# Patient Record
Sex: MALE | Race: WHITE | URBAN - METROPOLITAN AREA
[De-identification: names, ages, dates, MRNs, and addresses within clinical notes are randomized per-mention and may not be internally consistent; named-entity substitution may affect disease eponyms.]

---

## 2020-02-15 ENCOUNTER — ED HISTORICAL/CONVERTED ENCOUNTER (OUTPATIENT)
Dept: OTHER | Age: 40
End: 2020-02-15

## 2022-12-30 ENCOUNTER — APPOINTMENT (OUTPATIENT)
Dept: CT IMAGING | Age: 42
End: 2022-12-30
Attending: PHYSICIAN ASSISTANT
Payer: MEDICAID

## 2022-12-30 ENCOUNTER — APPOINTMENT (OUTPATIENT)
Dept: GENERAL RADIOLOGY | Age: 42
End: 2022-12-30
Attending: PHYSICIAN ASSISTANT
Payer: MEDICAID

## 2022-12-30 ENCOUNTER — HOSPITAL ENCOUNTER (OUTPATIENT)
Age: 42
Setting detail: OBSERVATION
Discharge: HOME | End: 2023-01-02
Attending: EMERGENCY MEDICINE | Admitting: INTERNAL MEDICINE
Payer: MEDICAID

## 2022-12-30 DIAGNOSIS — F31.9 BIPOLAR 1 DISORDER (HCC): ICD-10-CM

## 2022-12-30 DIAGNOSIS — R11.2 INTRACTABLE NAUSEA AND VOMITING: ICD-10-CM

## 2022-12-30 DIAGNOSIS — F11.20 METHADONE MAINTENANCE THERAPY PATIENT (HCC): Primary | ICD-10-CM

## 2022-12-30 LAB
ALBUMIN SERPL-MCNC: 3.6 G/DL (ref 3.5–5)
ALBUMIN/GLOB SERPL: 0.8 {RATIO} (ref 1.1–2.2)
ALP SERPL-CCNC: 135 U/L (ref 45–117)
ALT SERPL-CCNC: 56 U/L (ref 12–78)
AMPHET UR QL SCN: NEGATIVE
ANION GAP SERPL CALC-SCNC: 1 MMOL/L (ref 5–15)
APPEARANCE UR: CLEAR
AST SERPL-CCNC: 28 U/L (ref 15–37)
ATRIAL RATE: 70 BPM
BACTERIA URNS QL MICRO: NEGATIVE /HPF
BARBITURATES UR QL SCN: NEGATIVE
BASOPHILS # BLD: 0.1 K/UL (ref 0–0.1)
BASOPHILS NFR BLD: 1 % (ref 0–1)
BENZODIAZ UR QL: NEGATIVE
BILIRUB SERPL-MCNC: 0.4 MG/DL (ref 0.2–1)
BILIRUB UR QL: NEGATIVE
BUN SERPL-MCNC: 13 MG/DL (ref 6–20)
BUN/CREAT SERPL: 17 (ref 12–20)
CALCIUM SERPL-MCNC: 9.6 MG/DL (ref 8.5–10.1)
CALCULATED P AXIS, ECG09: 57 DEGREES
CALCULATED R AXIS, ECG10: 18 DEGREES
CALCULATED T AXIS, ECG11: 49 DEGREES
CANNABINOIDS UR QL SCN: POSITIVE
CHLORIDE SERPL-SCNC: 105 MMOL/L (ref 97–108)
CK SERPL-CCNC: 83 U/L (ref 39–308)
CO2 SERPL-SCNC: 28 MMOL/L (ref 21–32)
COCAINE UR QL SCN: POSITIVE
COLOR UR: ABNORMAL
COMMENT, HOLDF: NORMAL
COVID-19 RAPID TEST, COVR: NOT DETECTED
CREAT SERPL-MCNC: 0.78 MG/DL (ref 0.7–1.3)
DIAGNOSIS, 93000: NORMAL
DIFFERENTIAL METHOD BLD: ABNORMAL
DRUG SCRN COMMENT,DRGCM: ABNORMAL
EOSINOPHIL # BLD: 0.1 K/UL (ref 0–0.4)
EOSINOPHIL NFR BLD: 1 % (ref 0–7)
EPITH CASTS URNS QL MICRO: ABNORMAL /LPF
ERYTHROCYTE [DISTWIDTH] IN BLOOD BY AUTOMATED COUNT: 13.4 % (ref 11.5–14.5)
FLUAV AG NPH QL IA: NEGATIVE
FLUBV AG NOSE QL IA: NEGATIVE
GLOBULIN SER CALC-MCNC: 4.4 G/DL (ref 2–4)
GLUCOSE BLD STRIP.AUTO-MCNC: 114 MG/DL (ref 65–117)
GLUCOSE BLD STRIP.AUTO-MCNC: 134 MG/DL (ref 65–117)
GLUCOSE BLD STRIP.AUTO-MCNC: 137 MG/DL (ref 65–117)
GLUCOSE SERPL-MCNC: 126 MG/DL (ref 65–100)
GLUCOSE UR STRIP.AUTO-MCNC: NEGATIVE MG/DL
HCT VFR BLD AUTO: 47.6 % (ref 36.6–50.3)
HGB BLD-MCNC: 16 G/DL (ref 12.1–17)
HGB UR QL STRIP: NEGATIVE
HYALINE CASTS URNS QL MICRO: ABNORMAL /LPF (ref 0–2)
IMM GRANULOCYTES # BLD AUTO: 0.1 K/UL (ref 0–0.04)
IMM GRANULOCYTES NFR BLD AUTO: 1 % (ref 0–0.5)
KETONES UR QL STRIP.AUTO: 80 MG/DL
LEUKOCYTE ESTERASE UR QL STRIP.AUTO: ABNORMAL
LYMPHOCYTES # BLD: 1.9 K/UL (ref 0.8–3.5)
LYMPHOCYTES NFR BLD: 13 % (ref 12–49)
MCH RBC QN AUTO: 28 PG (ref 26–34)
MCHC RBC AUTO-ENTMCNC: 33.6 G/DL (ref 30–36.5)
MCV RBC AUTO: 83.2 FL (ref 80–99)
METHADONE UR QL: POSITIVE
MONOCYTES # BLD: 0.4 K/UL (ref 0–1)
MONOCYTES NFR BLD: 3 % (ref 5–13)
NEUTS SEG # BLD: 11.8 K/UL (ref 1.8–8)
NEUTS SEG NFR BLD: 81 % (ref 32–75)
NITRITE UR QL STRIP.AUTO: NEGATIVE
NRBC # BLD: 0 K/UL (ref 0–0.01)
NRBC BLD-RTO: 0 PER 100 WBC
OPIATES UR QL: NEGATIVE
P-R INTERVAL, ECG05: 170 MS
PCP UR QL: NEGATIVE
PH UR STRIP: 8.5 [PH] (ref 5–8)
PLATELET # BLD AUTO: 327 K/UL (ref 150–400)
PMV BLD AUTO: 11 FL (ref 8.9–12.9)
POTASSIUM SERPL-SCNC: 4.1 MMOL/L (ref 3.5–5.1)
PROT SERPL-MCNC: 8 G/DL (ref 6.4–8.2)
PROT UR STRIP-MCNC: 30 MG/DL
Q-T INTERVAL, ECG07: 444 MS
QRS DURATION, ECG06: 80 MS
QTC CALCULATION (BEZET), ECG08: 479 MS
RBC # BLD AUTO: 5.72 M/UL (ref 4.1–5.7)
RBC #/AREA URNS HPF: ABNORMAL /HPF (ref 0–5)
SAMPLES BEING HELD,HOLD: NORMAL
SERVICE CMNT-IMP: ABNORMAL
SERVICE CMNT-IMP: ABNORMAL
SERVICE CMNT-IMP: NORMAL
SODIUM SERPL-SCNC: 134 MMOL/L (ref 136–145)
SOURCE, COVRS: NORMAL
SP GR UR REFRACTOMETRY: 1.03
TROPONIN-HIGH SENSITIVITY: 4 NG/L (ref 0–76)
UA: UC IF INDICATED,UAUC: ABNORMAL
UROBILINOGEN UR QL STRIP.AUTO: 1 EU/DL (ref 0.2–1)
VENTRICULAR RATE, ECG03: 70 BPM
WBC # BLD AUTO: 14.3 K/UL (ref 4.1–11.1)
WBC URNS QL MICRO: ABNORMAL /HPF (ref 0–4)

## 2022-12-30 PROCEDURE — 96374 THER/PROPH/DIAG INJ IV PUSH: CPT

## 2022-12-30 PROCEDURE — 74011000250 HC RX REV CODE- 250: Performed by: INTERNAL MEDICINE

## 2022-12-30 PROCEDURE — 74177 CT ABD & PELVIS W/CONTRAST: CPT

## 2022-12-30 PROCEDURE — 74011000258 HC RX REV CODE- 258: Performed by: PHYSICIAN ASSISTANT

## 2022-12-30 PROCEDURE — 96375 TX/PRO/DX INJ NEW DRUG ADDON: CPT

## 2022-12-30 PROCEDURE — 36415 COLL VENOUS BLD VENIPUNCTURE: CPT

## 2022-12-30 PROCEDURE — 93005 ELECTROCARDIOGRAM TRACING: CPT

## 2022-12-30 PROCEDURE — 71046 X-RAY EXAM CHEST 2 VIEWS: CPT

## 2022-12-30 PROCEDURE — 80053 COMPREHEN METABOLIC PANEL: CPT

## 2022-12-30 PROCEDURE — 85025 COMPLETE CBC W/AUTO DIFF WBC: CPT

## 2022-12-30 PROCEDURE — 74011000636 HC RX REV CODE- 636: Performed by: EMERGENCY MEDICINE

## 2022-12-30 PROCEDURE — 81001 URINALYSIS AUTO W/SCOPE: CPT

## 2022-12-30 PROCEDURE — 80307 DRUG TEST PRSMV CHEM ANLYZR: CPT

## 2022-12-30 PROCEDURE — 87804 INFLUENZA ASSAY W/OPTIC: CPT

## 2022-12-30 PROCEDURE — 74011250636 HC RX REV CODE- 250/636: Performed by: INTERNAL MEDICINE

## 2022-12-30 PROCEDURE — 82550 ASSAY OF CK (CPK): CPT

## 2022-12-30 PROCEDURE — 74011250637 HC RX REV CODE- 250/637: Performed by: INTERNAL MEDICINE

## 2022-12-30 PROCEDURE — 87635 SARS-COV-2 COVID-19 AMP PRB: CPT

## 2022-12-30 PROCEDURE — 82962 GLUCOSE BLOOD TEST: CPT

## 2022-12-30 PROCEDURE — 84484 ASSAY OF TROPONIN QUANT: CPT

## 2022-12-30 PROCEDURE — G0378 HOSPITAL OBSERVATION PER HR: HCPCS

## 2022-12-30 PROCEDURE — 74011250636 HC RX REV CODE- 250/636: Performed by: PHYSICIAN ASSISTANT

## 2022-12-30 PROCEDURE — 74011250636 HC RX REV CODE- 250/636

## 2022-12-30 PROCEDURE — 99285 EMERGENCY DEPT VISIT HI MDM: CPT

## 2022-12-30 RX ORDER — ONDANSETRON 2 MG/ML
4 INJECTION INTRAMUSCULAR; INTRAVENOUS ONCE
Status: COMPLETED | OUTPATIENT
Start: 2022-12-30 | End: 2022-12-30

## 2022-12-30 RX ORDER — SODIUM CHLORIDE 0.9 % (FLUSH) 0.9 %
5-40 SYRINGE (ML) INJECTION EVERY 8 HOURS
Status: DISCONTINUED | OUTPATIENT
Start: 2022-12-30 | End: 2023-01-02 | Stop reason: HOSPADM

## 2022-12-30 RX ORDER — POLYETHYLENE GLYCOL 3350 17 G/17G
17 POWDER, FOR SOLUTION ORAL DAILY PRN
Status: DISCONTINUED | OUTPATIENT
Start: 2022-12-30 | End: 2023-01-02 | Stop reason: HOSPADM

## 2022-12-30 RX ORDER — MIRTAZAPINE 15 MG/1
30 TABLET, FILM COATED ORAL DAILY
Status: DISCONTINUED | OUTPATIENT
Start: 2022-12-30 | End: 2023-01-02 | Stop reason: HOSPADM

## 2022-12-30 RX ORDER — INSULIN GLARGINE 100 [IU]/ML
55 INJECTION, SOLUTION SUBCUTANEOUS DAILY
Status: ON HOLD | COMMUNITY
End: 2023-01-02 | Stop reason: SDUPTHER

## 2022-12-30 RX ORDER — METHADONE HYDROCHLORIDE 10 MG/ML
2.5 INJECTION, SOLUTION INTRAMUSCULAR; INTRAVENOUS; SUBCUTANEOUS
Status: CANCELLED | OUTPATIENT
Start: 2022-12-30 | End: 2023-01-06

## 2022-12-30 RX ORDER — ONDANSETRON 4 MG/1
4 TABLET, ORALLY DISINTEGRATING ORAL
Status: DISCONTINUED | OUTPATIENT
Start: 2022-12-30 | End: 2023-01-02 | Stop reason: HOSPADM

## 2022-12-30 RX ORDER — IBUPROFEN 200 MG
1 TABLET ORAL DAILY
Status: DISCONTINUED | OUTPATIENT
Start: 2022-12-31 | End: 2022-12-30

## 2022-12-30 RX ORDER — ACETAMINOPHEN 325 MG/1
650 TABLET ORAL
Status: DISCONTINUED | OUTPATIENT
Start: 2022-12-30 | End: 2023-01-02 | Stop reason: HOSPADM

## 2022-12-30 RX ORDER — CLONAZEPAM 1 MG/1
1 TABLET ORAL 3 TIMES DAILY
Status: ON HOLD | COMMUNITY
End: 2023-01-02 | Stop reason: SDUPTHER

## 2022-12-30 RX ORDER — ZIPRASIDONE HYDROCHLORIDE 60 MG/1
60 CAPSULE ORAL 2 TIMES DAILY WITH MEALS
Status: ON HOLD | COMMUNITY
End: 2023-01-02 | Stop reason: SDUPTHER

## 2022-12-30 RX ORDER — LORAZEPAM 2 MG/ML
1 INJECTION INTRAMUSCULAR ONCE
Status: DISCONTINUED | OUTPATIENT
Start: 2022-12-30 | End: 2022-12-30

## 2022-12-30 RX ORDER — ONDANSETRON 2 MG/ML
4 INJECTION INTRAMUSCULAR; INTRAVENOUS
Status: DISCONTINUED | OUTPATIENT
Start: 2022-12-30 | End: 2023-01-02 | Stop reason: HOSPADM

## 2022-12-30 RX ORDER — PANTOPRAZOLE SODIUM 40 MG/1
40 TABLET, DELAYED RELEASE ORAL
Status: DISCONTINUED | OUTPATIENT
Start: 2022-12-30 | End: 2023-01-02 | Stop reason: HOSPADM

## 2022-12-30 RX ORDER — INSULIN LISPRO 100 [IU]/ML
INJECTION, SOLUTION INTRAVENOUS; SUBCUTANEOUS
Status: DISCONTINUED | OUTPATIENT
Start: 2022-12-30 | End: 2023-01-02 | Stop reason: HOSPADM

## 2022-12-30 RX ORDER — PANTOPRAZOLE SODIUM 40 MG/1
40 TABLET, DELAYED RELEASE ORAL
Status: DISCONTINUED | OUTPATIENT
Start: 2022-12-31 | End: 2022-12-30

## 2022-12-30 RX ORDER — METOCLOPRAMIDE HYDROCHLORIDE 5 MG/ML
10 INJECTION INTRAMUSCULAR; INTRAVENOUS
Status: COMPLETED | OUTPATIENT
Start: 2022-12-30 | End: 2022-12-30

## 2022-12-30 RX ORDER — CLONIDINE HYDROCHLORIDE 0.2 MG/1
0.2 TABLET ORAL 2 TIMES DAILY
COMMUNITY

## 2022-12-30 RX ORDER — METOCLOPRAMIDE HYDROCHLORIDE 5 MG/ML
10 INJECTION INTRAMUSCULAR; INTRAVENOUS
Status: DISCONTINUED | OUTPATIENT
Start: 2022-12-30 | End: 2022-12-31

## 2022-12-30 RX ORDER — SODIUM CHLORIDE 0.9 % (FLUSH) 0.9 %
5-40 SYRINGE (ML) INJECTION AS NEEDED
Status: DISCONTINUED | OUTPATIENT
Start: 2022-12-30 | End: 2023-01-02 | Stop reason: HOSPADM

## 2022-12-30 RX ORDER — ZIPRASIDONE HYDROCHLORIDE 20 MG/1
60 CAPSULE ORAL 2 TIMES DAILY WITH MEALS
Status: DISCONTINUED | OUTPATIENT
Start: 2022-12-30 | End: 2023-01-02 | Stop reason: HOSPADM

## 2022-12-30 RX ORDER — IBUPROFEN 200 MG
1 TABLET ORAL DAILY
Status: DISCONTINUED | OUTPATIENT
Start: 2022-12-30 | End: 2023-01-02 | Stop reason: HOSPADM

## 2022-12-30 RX ORDER — MIRTAZAPINE 30 MG/1
30 TABLET, FILM COATED ORAL
COMMUNITY

## 2022-12-30 RX ORDER — ACETAMINOPHEN 650 MG/1
650 SUPPOSITORY RECTAL
Status: DISCONTINUED | OUTPATIENT
Start: 2022-12-30 | End: 2023-01-02 | Stop reason: HOSPADM

## 2022-12-30 RX ORDER — ENOXAPARIN SODIUM 100 MG/ML
40 INJECTION SUBCUTANEOUS DAILY
Status: DISCONTINUED | OUTPATIENT
Start: 2022-12-31 | End: 2023-01-02 | Stop reason: HOSPADM

## 2022-12-30 RX ORDER — MAGNESIUM SULFATE 100 %
4 CRYSTALS MISCELLANEOUS AS NEEDED
Status: DISCONTINUED | OUTPATIENT
Start: 2022-12-30 | End: 2023-01-02 | Stop reason: HOSPADM

## 2022-12-30 RX ORDER — LORAZEPAM 2 MG/ML
1 INJECTION INTRAMUSCULAR ONCE
Status: COMPLETED | OUTPATIENT
Start: 2022-12-30 | End: 2022-12-30

## 2022-12-30 RX ORDER — METHADONE HYDROCHLORIDE 10 MG/1
90 TABLET ORAL DAILY
Status: DISCONTINUED | OUTPATIENT
Start: 2022-12-31 | End: 2023-01-02 | Stop reason: HOSPADM

## 2022-12-30 RX ORDER — METHADONE HYDROCHLORIDE 10 MG/ML
95 INJECTION, SOLUTION INTRAMUSCULAR; INTRAVENOUS; SUBCUTANEOUS ONCE
Status: DISCONTINUED | OUTPATIENT
Start: 2022-12-30 | End: 2022-12-30

## 2022-12-30 RX ADMIN — ONDANSETRON 4 MG: 2 INJECTION INTRAMUSCULAR; INTRAVENOUS at 11:46

## 2022-12-30 RX ADMIN — PANTOPRAZOLE SODIUM 40 MG: 40 TABLET, DELAYED RELEASE ORAL at 19:27

## 2022-12-30 RX ADMIN — LORAZEPAM 1 MG: 2 INJECTION INTRAMUSCULAR; INTRAVENOUS at 13:06

## 2022-12-30 RX ADMIN — IOPAMIDOL 100 ML: 755 INJECTION, SOLUTION INTRAVENOUS at 12:53

## 2022-12-30 RX ADMIN — MIRTAZAPINE 30 MG: 15 TABLET, FILM COATED ORAL at 16:14

## 2022-12-30 RX ADMIN — METHADONE HYDROCHLORIDE: 10 INJECTION, SOLUTION INTRAMUSCULAR; INTRAVENOUS; SUBCUTANEOUS at 11:46

## 2022-12-30 RX ADMIN — ZIPRASIDONE HYDROCHLORIDE 60 MG: 40 CAPSULE ORAL at 16:14

## 2022-12-30 RX ADMIN — SODIUM CHLORIDE, PRESERVATIVE FREE 10 ML: 5 INJECTION INTRAVENOUS at 14:34

## 2022-12-30 RX ADMIN — METOCLOPRAMIDE 10 MG: 5 INJECTION, SOLUTION INTRAMUSCULAR; INTRAVENOUS at 14:54

## 2022-12-30 RX ADMIN — SODIUM CHLORIDE, PRESERVATIVE FREE 10 ML: 5 INJECTION INTRAVENOUS at 21:07

## 2022-12-30 RX ADMIN — SODIUM CHLORIDE 1000 ML: 9 INJECTION, SOLUTION INTRAVENOUS at 11:46

## 2022-12-30 NOTE — ED NOTES
Face-To-Face Encounter with an TELLY's Patient:      The patient presents with nausea and vomiting. Missed methadone today. My exam shows 38 YOM, resting in bed, mildly diaphoretic. No abdominal distention. Impression/Plan: 44-year-old male presents emerged department chief plaint of myalgias and nausea with vomiting that began 2 days ago. Does report missing his methadone dose today. History of polysubstance abuse. Patient appears uncomfortable but nontoxic. Basic labs were obtained, these are unremarkable other than mild reactive leukocytosis. CT without acute pathology. Urinalysis not consistent with cystitis noted on CT. Patient's UDS is positive for cocaine, EKG unremarkable, troponin not elevated. Doubt vasospastic ACS. Will medicate for nausea, bolus fluids, confirmed with patient's methadone clinic for his dose that he missed. Ativan as needed for comfort. If ED work-up is negative, anticipate patient be discharged with PCP follow-up and to follow-up with his methadone clinic.    2:43 PM: Patient continues to vomit, Eric Li discussed patient with Dr. Shira Mcnamara for admission. I have personally seen and examined the patient, reviewed the TELLY's note and agree with findings and plan. I have personally performed a substantial portion of the encounter.     Judson Franklin MD

## 2022-12-30 NOTE — H&P
GENERAL GENERIC H&P/CONSULT  Presenting complaint:Nausea/vomiting    Subjective: 82-year-old male with past medical history multiple comorbidities presents to [facility] with complaints of nausea and vomiting. Patient states over the past few days has been having multiple episodes of nausea and vomiting, patient was unable to get his methadone dose, this is similar to methadone withdrawal that has occurred in the past.  Patient denies any fevers chills lightheadedness dizziness dyspnea orthopnea paroxysmal nocturnal dyspnea chest pain palpitations headache focal weakness loss of sensation auditory or visual symptoms abdominal stool or urinary complaints or any other associated symptoms. Patient endorses no recent sick contacts or travel activity    Past Medical History:   Diagnosis Date    Anxiety     Bipolar affect, depressed ([facility]     Diabetes ([facility]     Hypercholesteremia     Hypertension     Paranoia ([facility]     [facility]       History reviewed. No pertinent surgical history. Prior to Admission medications    Medication Sig Start Date End Date Taking? Authorizing Provider   cloNIDine HCL (CATAPRES) 0.2 mg tablet Take 0.2 mg by mouth two (2) times a day. Yes Keila, MD Sofia   ziprasidone hcl (Geodon) 60 mg capsule Take 60 mg by mouth two (2) times daily (with meals). Yes Sofia Pedraza MD   mirtazapine (Remeron) 30 mg tablet Take 30 mg by mouth nightly. Yes Sofia Pedraza MD   clonazePAM (KlonoPIN) 1 mg tablet Take 1 mg by mouth three (3) times daily. Yes Keila, MD Sofia   insulin glargine (Lantus Solostar U-100 Insulin) 100 unit/mL (3 mL) inpn 55 Units by SubCUTAneous route daily. Yes Sofia Pedraza MD   insulin regular (HumuLIN R Regular U-100 Insuln) 100 unit/mL injection 17 Units by SubCUTAneous route daily as needed.    Yes Keila, MD Sofia     No Known Allergies   Social History     Tobacco Use    Smoking status: Every Day     Packs/day: 1.00     Types: Cigarettes    Smokeless tobacco: Not on file   Substance Use Topics    Alcohol use: Not Currently      History reviewed. No pertinent family history. Review of Systems   Constitutional:  Positive for activity change, appetite change and fatigue. Negative for chills, diaphoresis, fever and unexpected weight change. HENT:  Negative for congestion, dental problem, drooling, ear discharge, ear pain, facial swelling, hearing loss, mouth sores, nosebleeds, postnasal drip, rhinorrhea, sinus pressure, sinus pain, sneezing, sore throat, tinnitus, trouble swallowing and voice change. Eyes:  Negative for photophobia, pain, discharge, redness, itching and visual disturbance. Respiratory:  Negative for apnea, cough, choking, chest tightness, shortness of breath, wheezing and stridor. Cardiovascular:  Negative for chest pain, palpitations and leg swelling. Gastrointestinal:  Positive for nausea and vomiting. Negative for abdominal distention, abdominal pain, anal bleeding, blood in stool, constipation, diarrhea and rectal pain. Endocrine: Negative for cold intolerance, heat intolerance, polydipsia, polyphagia and polyuria. Genitourinary:  Negative for decreased urine volume, difficulty urinating, dysuria, enuresis, flank pain, frequency, genital sores, hematuria, penile discharge, penile pain, penile swelling, scrotal swelling, testicular pain and urgency. Musculoskeletal:  Negative for arthralgias, back pain, gait problem, joint swelling, myalgias, neck pain and neck stiffness. Skin:  Negative for color change, pallor, rash and wound. Allergic/Immunologic: Negative for environmental allergies, food allergies and immunocompromised state. Neurological:  Positive for weakness. Negative for dizziness, tremors, seizures, syncope, facial asymmetry, speech difficulty, light-headedness, numbness and headaches. Hematological:  Negative for adenopathy. Does not bruise/bleed easily.    Psychiatric/Behavioral:  Negative for agitation, behavioral problems, confusion, decreased concentration, dysphoric mood, hallucinations, self-injury, sleep disturbance and suicidal ideas. The patient is not nervous/anxious and is not hyperactive. Objective:    No intake/output data recorded. No intake/output data recorded. Patient Vitals for the past 8 hrs:   BP Temp Pulse Resp SpO2 Height Weight   12/30/22 0903 (!) 159/89 97.2 °F (36.2 °C) 70 20 98 % 6' (1.829 m) 89.8 kg (198 lb)     Physical Exam  Vitals reviewed. Constitutional:       General: He is not in acute distress. Appearance: He is normal weight. He is ill-appearing. He is not toxic-appearing or diaphoretic. HENT:      Head: Normocephalic and atraumatic. Nose: Nose normal. No congestion or rhinorrhea. Mouth/Throat:      Mouth: Mucous membranes are moist.      Pharynx: Oropharynx is clear. No oropharyngeal exudate or posterior oropharyngeal erythema. Eyes:      General: No scleral icterus. Right eye: No discharge. Left eye: No discharge. Extraocular Movements: Extraocular movements intact. Conjunctiva/sclera: Conjunctivae normal.      Pupils: Pupils are equal, round, and reactive to light. Neck:      Vascular: No carotid bruit. Cardiovascular:      Rate and Rhythm: Normal rate and regular rhythm. Pulses: Normal pulses. Heart sounds: Normal heart sounds. No murmur heard. No friction rub. No gallop. Pulmonary:      Effort: Pulmonary effort is normal. No respiratory distress. Breath sounds: Normal breath sounds. No stridor. No wheezing, rhonchi or rales. Chest:      Chest wall: No tenderness. Abdominal:      General: Abdomen is flat. Bowel sounds are normal. There is no distension. Palpations: Abdomen is soft. There is no mass. Tenderness: There is no abdominal tenderness. There is no right CVA tenderness, left CVA tenderness, guarding or rebound. Hernia: No hernia is present.    Musculoskeletal:         General: No swelling, tenderness, deformity or signs of injury. Normal range of motion. Cervical back: Normal range of motion and neck supple. No rigidity or tenderness. Right lower leg: No edema. Left lower leg: No edema. Lymphadenopathy:      Cervical: No cervical adenopathy. Skin:     General: Skin is warm. Capillary Refill: Capillary refill takes less than 2 seconds. Coloration: Skin is not jaundiced or pale. Findings: No bruising, erythema, lesion or rash. Neurological:      General: No focal deficit present. Mental Status: He is alert and oriented to person, place, and time. Mental status is at baseline. Cranial Nerves: No cranial nerve deficit. Sensory: No sensory deficit. Motor: No weakness. Coordination: Coordination normal.      Gait: Gait normal.      Deep Tendon Reflexes: Reflexes normal.   Psychiatric:         Mood and Affect: Mood normal.         Behavior: Behavior normal.         Thought Content:  Thought content normal.         Judgment: Judgment normal.        Labs:    Recent Results (from the past 24 hour(s))   GLUCOSE, POC    Collection Time: 12/30/22  9:07 AM   Result Value Ref Range    Glucose (POC) 114 65 - 117 mg/dL    Performed by Kristina Sevilla RN    TROPONIN-HIGH SENSITIVITY    Collection Time: 12/30/22 10:25 AM   Result Value Ref Range    Troponin-High Sensitivity 4 0 - 76 ng/L   CBC WITH AUTOMATED DIFF    Collection Time: 12/30/22 10:26 AM   Result Value Ref Range    WBC 14.3 (H) 4.1 - 11.1 K/uL    RBC 5.72 (H) 4.10 - 5.70 M/uL    HGB 16.0 12.1 - 17.0 g/dL    HCT 47.6 36.6 - 50.3 %    MCV 83.2 80.0 - 99.0 FL    MCH 28.0 26.0 - 34.0 PG    MCHC 33.6 30.0 - 36.5 g/dL    RDW 13.4 11.5 - 14.5 %    PLATELET 023 353 - 284 K/uL    MPV 11.0 8.9 - 12.9 FL    NRBC 0.0 0  WBC    ABSOLUTE NRBC 0.00 0.00 - 0.01 K/uL    NEUTROPHILS 81 (H) 32 - 75 %    LYMPHOCYTES 13 12 - 49 %    MONOCYTES 3 (L) 5 - 13 %    EOSINOPHILS 1 0 - 7 %    BASOPHILS 1 0 - 1 %    IMMATURE GRANULOCYTES 1 (H) 0.0 - 0.5 %    ABS. NEUTROPHILS 11.8 (H) 1.8 - 8.0 K/UL    ABS. LYMPHOCYTES 1.9 0.8 - 3.5 K/UL    ABS. MONOCYTES 0.4 0.0 - 1.0 K/UL    ABS. EOSINOPHILS 0.1 0.0 - 0.4 K/UL    ABS. BASOPHILS 0.1 0.0 - 0.1 K/UL    ABS. IMM. GRANS. 0.1 (H) 0.00 - 0.04 K/UL    DF AUTOMATED     CK    Collection Time: 12/30/22 10:26 AM   Result Value Ref Range    CK 83 39 - 975 U/L   METABOLIC PANEL, COMPREHENSIVE    Collection Time: 12/30/22 10:26 AM   Result Value Ref Range    Sodium 134 (L) 136 - 145 mmol/L    Potassium 4.1 3.5 - 5.1 mmol/L    Chloride 105 97 - 108 mmol/L    CO2 28 21 - 32 mmol/L    Anion gap 1 (L) 5 - 15 mmol/L    Glucose 126 (H) 65 - 100 mg/dL    BUN 13 6 - 20 MG/DL    Creatinine 0.78 0.70 - 1.30 MG/DL    BUN/Creatinine ratio 17 12 - 20      eGFR >60 >60 ml/min/1.73m2    Calcium 9.6 8.5 - 10.1 MG/DL    Bilirubin, total 0.4 0.2 - 1.0 MG/DL    ALT (SGPT) 56 12 - 78 U/L    AST (SGOT) 28 15 - 37 U/L    Alk. phosphatase 135 (H) 45 - 117 U/L    Protein, total 8.0 6.4 - 8.2 g/dL    Albumin 3.6 3.5 - 5.0 g/dL    Globulin 4.4 (H) 2.0 - 4.0 g/dL    A-G Ratio 0.8 (L) 1.1 - 2.2     SAMPLES BEING HELD    Collection Time: 12/30/22 10:26 AM   Result Value Ref Range    SAMPLES BEING HELD PST     COMMENT        Add-on orders for these samples will be processed based on acceptable specimen integrity and analyte stability, which may vary by analyte.    URINALYSIS W/ REFLEX CULTURE    Collection Time: 12/30/22 11:39 AM    Specimen: Urine   Result Value Ref Range    Color YELLOW/STRAW      Appearance CLEAR CLEAR      Specific gravity 1.026      pH (UA) 8.5 (H) 5.0 - 8.0      Protein 30 (A) NEG mg/dL    Glucose Negative NEG mg/dL    Ketone 80 (A) NEG mg/dL    Bilirubin Negative NEG      Blood Negative NEG      Urobilinogen 1.0 0.2 - 1.0 EU/dL    Nitrites Negative NEG      Leukocyte Esterase TRACE (A) NEG      UA:UC IF INDICATED CULTURE NOT INDICATED BY UA RESULT CNI      WBC 0-4 0 - 4 /hpf    RBC 0-5 0 - 5 /hpf    Epithelial cells FEW FEW /lpf    Bacteria Negative NEG /hpf    Hyaline cast 0-2 0 - 2 /lpf   DRUG SCREEN, URINE    Collection Time: 12/30/22 11:39 AM   Result Value Ref Range    AMPHETAMINES Negative NEG      BARBITURATES Negative NEG      BENZODIAZEPINES Negative NEG      COCAINE Positive (A) NEG      METHADONE Positive (A) NEG      OPIATES Negative NEG      PCP(PHENCYCLIDINE) Negative NEG      THC (TH-CANNABINOL) Positive (A) NEG      Drug screen comment (NOTE)    INFLUENZA A+B VIRAL AGS    Collection Time: 12/30/22 11:39 AM   Result Value Ref Range    Influenza A Antigen Negative NEG      Influenza B Antigen Negative NEG     COVID-19 RAPID TEST    Collection Time: 12/30/22 11:39 AM   Result Value Ref Range    Specimen source Nasopharyngeal      COVID-19 rapid test Not detected NOTD     EKG, 12 LEAD, INITIAL    Collection Time: 12/30/22  1:07 PM   Result Value Ref Range    Ventricular Rate 70 BPM    Atrial Rate 70 BPM    P-R Interval 170 ms    QRS Duration 80 ms    Q-T Interval 444 ms    QTC Calculation (Bezet) 479 ms    Calculated P Axis 57 degrees    Calculated R Axis 18 degrees    Calculated T Axis 49 degrees    Diagnosis       Normal sinus rhythm  Normal ECG  No previous ECGs available         ECG: nonspecific ST and T waves changes     CT abdomen/pelvis:IMPRESSION  1. The bladder is mostly decompressed with some concentric mural thickening. Recommend clinical correlation for cystitis. 2. Hepatic steatosis. 3. Incidental findings as above.     Assessment:  Active Problems:    Intractable nausea and vomiting (12/30/2022)        Plan:    Intractable nausea and vomiting-patient presents with vomiting symptomatology found of intractable nausea and vomiting, likely secondary to opiate withdrawal, of note patient missed methadone doses, feels slightly better at this time  Restart patient's home methadone dosage  Zofran as needed for symptomatic relief  Continue to monitor patient's clinical status    Opiate withdrawal-reinitiate patient's methadone according to home dosage, patient is in a methadone program  Obtain psychiatry consult    History of bipolar disorder-continue home medications    Type 2 diabetes-insulin sliding scale at this time    Prophylaxis-Lovenox  FEN-diabetic diet, replete potassium and magnesium  Full code, will clarify about surrogate decision-maker, admitted for observation and further management    Signed:   Antonio Moyer MD 12/30/2022

## 2022-12-30 NOTE — ED NOTES
Patient is being transferred to [address], Room # 2214. Report given to [facility], RN on Olinda Rodriguez for routine progression of care. Report consisted of the following information SBAR, Kardex, ED Summary, MAR and Recent Results. Patient transferred to receiving unit by: transport (RN or tech name). Outstanding consults needed: No     Next labs due: Yes    The following personal items will be sent with the patient during transfer to the floor: All valuables:    Cardiac monitoring ordered: Yes    The following CURRENT information was reported to the receiving RN:    Code status: Full Code at time of transfer    Last set of vital signs:  Vital Signs  Level of Consciousness: Alert (0) (12/30/22 0903)  Temp: 97.2 °F (36.2 °C) (12/30/22 0903)  Temp Source: Oral (12/30/22 0903)  Pulse (Heart Rate): 70 (12/30/22 0903)  Resp Rate: 20 (12/30/22 0903)  BP: (!) 159/89 (12/30/22 0903)  MAP (Calculated): 112 (12/30/22 0903)  MEWS Score: 1 (12/30/22 0903)         Oxygen Therapy  O2 Sat (%): 98 % (12/30/22 0903)  O2 Device: None (Room air) (12/30/22 0903)      Last pain assessment:  Pain 1  Pain Scale 1: Numeric (0 - 10)  Pain Intensity 1: 10      Wounds: No     Urinary catheter: voiding  Is there a [place] order: No     LDAs:       Peripheral IV 12/30/22 Left;Posterior Hand (Active)         Opportunity for questions and clarification was provided.     Pavan Ruelas RN

## 2022-12-30 NOTE — ED PROVIDER NOTES
EMERGENCY DEPARTMENT HISTORY AND PHYSICAL EXAM      Pt Name: Shelby Tan  MRN: [id number]  [place] 1980  Date of evaluation: 12/30/2022  Provider: COBY Tesfaye    PCP: No primary care provider on file. Note Started: 9:51 AM 12/30/22    Shared Not Shared TELLY: I have seen and evaluated the patient. My supervision physician was available for consultation. History of Presenting Illness     Chief Complaint   Patient presents with    Generalized Body Aches     Pt arrives to ED via EMS with a cc of generally not feeling well x 2-3 days; pt states he is out of BP and diabetic medications; EMS states that patient used crack last night; pt coming coming from hotel        History Provided By: Patient    HPI: Shelby Tan, 38 y.o. male with past medical history as documented below, polysubstance abuse on methadone who presents emerged part with multiple complaints. For the past 2 days he has had a constellation of fatigue, vomiting, sweating and generalized feelings of withdrawal.  He states he missed his his methadone yesterday and feels like he is in withdrawal.  He does report using crack cocaine last evening with no resolution in his symptoms. He denies any fevers, headaches, dizziness, chest pain, shortness of breath, abdominal pain, diarrhea, bloody stools, dysuria, hematuria, rash. There are no other complaints, changes, or physical findings at this time. PCP: No primary care provider on file. No current facility-administered medications on file prior to encounter. No current outpatient medications on file prior to encounter. Past History     Past Medical History:  No past medical history on file. Past Surgical History:  No past surgical history on file. Family History:  No family history on file. Social History: Allergies:  No Known Allergies      Review of Systems   Review of Systems   Constitutional:  Positive for diaphoresis and fatigue.  Negative for chills and fever.   HENT:  Negative for congestion and sore throat. Respiratory:  Negative for cough and shortness of breath. Cardiovascular:  Negative for chest pain. Gastrointestinal:  Positive for nausea and vomiting. Negative for abdominal pain and diarrhea. Genitourinary:  Negative for dysuria and hematuria. Musculoskeletal:  Positive for myalgias. Skin:  Negative for rash. Neurological:  Negative for headaches. All other systems reviewed and are negative. Physical Exam   Patient Vitals for the past 4 hrs:   Temp Pulse Resp BP SpO2   12/30/22 0903 97.2 °F (36.2 °C) 70 20 (!) 159/89 98 %        Physical Exam  Vitals and nursing note reviewed. Constitutional:       General: He is not in acute distress. Appearance: He is not toxic-appearing. Comments: Patient uncomfortable,    HENT:      Head: Atraumatic. Right Ear: External ear normal.      Left Ear: External ear normal.      Nose: Nose normal.      Mouth/Throat:      Mouth: Mucous membranes are moist.   Eyes:      Extraocular Movements: Extraocular movements intact. Conjunctiva/sclera: Conjunctivae normal.   Cardiovascular:      Rate and Rhythm: Normal rate and regular rhythm. Pulses: Normal pulses. Heart sounds: Normal heart sounds. No murmur heard. No friction rub. No gallop. Pulmonary:      Effort: Pulmonary effort is normal. No respiratory distress. Breath sounds: Normal breath sounds. No stridor. No wheezing, rhonchi or rales. Abdominal:      General: Abdomen is flat. There is no distension. Palpations: Abdomen is soft. Tenderness: There is no abdominal tenderness. There is no guarding or rebound. Comments: Abdomen soft, nontender with no guarding rigidity or rebound tenderness     Musculoskeletal:         General: No swelling. Cervical back: Neck supple. Skin:     General: Skin is warm. Neurological:      Mental Status: He is alert and oriented to person, place, and time. Psychiatric:         Mood and Affect: Mood normal.         Behavior: Behavior normal.         Thought Content: Thought content normal.         Judgment: Judgment normal.       Diagnostic Study Results     Labs -     Recent Results (from the past 12 hour(s))   GLUCOSE, POC    Collection Time: 12/30/22  9:07 AM   Result Value Ref Range    Glucose (POC) 114 65 - 117 mg/dL    Performed by Christiane Reese RN        EKG: When ordered, EKG's are interpreted by the Emergency Department Physician in the absence of a cardiologist.  Please see their note for interpretation of EKG. Radiologic Studies -   No results found. Medical Decision Making   I am the first provider for this patient. I reviewed the vital signs, available nursing notes, past medical history, past surgical history, family history and social history. Vital Signs-Reviewed the patient's vital signs. Patient Vitals for the past 12 hrs:   Temp Pulse Resp BP SpO2   12/30/22 0903 97.2 °F (36.2 °C) 70 20 (!) 159/89 98 %       Records Reviewed: Nursing Notes and Old Medical Records    Provider Notes (Medical Decision Making):  Patient was evaluated for a 2-day history of intractable vomiting, malaise and withdrawal-like symptoms in the setting of polysubstance abuse and methadone treatment. Patient did report a recent history of crack cocaine use but otherwise no chest pain, abdominal pain or shortness of breath. EKG and troponin showed no signs of ACS. CT abdomen pelvis had no acute findings. No RIVERA or concerning electrolyte disturbance. Despite being given methadone, Zofran and fluids patient's vomiting did not come under control. Patient was subsequently admitted to the hospitalist service for further stabilization and evaluation. ED Course:   Initial assessment performed. The patients presenting problems have been discussed, and they are in agreement with the care plan formulated and outlined with them.   I have encouraged them to ask questions as they arise throughout their visit. ED Course as of 12/30/22 1542   Fri Dec 30, 2022   1002 I spoke with the nurse on-call at [facility] methadone clinic who confirmed patient receives 95 mg daily. His last dose was at 5:51 AM yesterday, which is not consistent with the patient's history stating that his last methadone dose was 2 days ago. He has not had any methadone today. I did confirm with [facility] that it was okay to administer methadone to the patient in the ED [AJ]   1229 Patient reevaluated. He is tolerating p.o. but continues to state that he is \"not feeling well \", but does not localize articulate his symptoms otherwise. He is diaphoretic. He has mild left lower quadrant tenderness but otherwise unremarkable abdomen. Will order CT abdomen pelvis, chest x-ray, troponin and EKG. [AJ]   1313 EKG interpreted by me. Shows NSR with a HR of 70. No ST elevations or depressions concerning for ischemia. Normal intervals. [MB]   4841 Reassessed patient, improving symptoms, resting in bed. [MB]   4448 I discussed the case with hospitalist Dr. Urvashi Barrera who will admit the patient for intractable nausea and vomiting. [AJ]      ED Course User Index  [AJ] COBY Beyer  [MB] Alem Thompson MD       Critical Care Time: None    Disposition:  Admission    PLAN:  1. There are no discharge medications for this patient. 2.   Follow-up Information    None       Return to ED if worse     Diagnosis     Clinical Impression: No diagnosis found. Withdrawal symptoms, intractable nausea and vomiting, dehydration, polysubstance abuse  COBY Lewis (Electronic Signature)          Please note that this dictation was completed with [employer], the [employer] voice recognition software. Quite often unanticipated grammatical, syntax, homophones, and other interpretive errors are inadvertently transcribed by the computer software. Please disregards these errors.  Please excuse any errors that have escaped final proofreading.

## 2022-12-31 LAB
ANION GAP SERPL CALC-SCNC: 2 MMOL/L (ref 5–15)
BASOPHILS # BLD: 0.1 K/UL (ref 0–0.1)
BASOPHILS NFR BLD: 1 % (ref 0–1)
BUN SERPL-MCNC: 12 MG/DL (ref 6–20)
BUN/CREAT SERPL: 15 (ref 12–20)
CALCIUM SERPL-MCNC: 9 MG/DL (ref 8.5–10.1)
CHLORIDE SERPL-SCNC: 109 MMOL/L (ref 97–108)
CO2 SERPL-SCNC: 28 MMOL/L (ref 21–32)
CREAT SERPL-MCNC: 0.81 MG/DL (ref 0.7–1.3)
DIFFERENTIAL METHOD BLD: ABNORMAL
EOSINOPHIL # BLD: 0.5 K/UL (ref 0–0.4)
EOSINOPHIL NFR BLD: 4 % (ref 0–7)
ERYTHROCYTE [DISTWIDTH] IN BLOOD BY AUTOMATED COUNT: 13.6 % (ref 11.5–14.5)
EST. AVERAGE GLUCOSE BLD GHB EST-MCNC: 148 MG/DL
GLUCOSE BLD STRIP.AUTO-MCNC: 130 MG/DL (ref 65–117)
GLUCOSE BLD STRIP.AUTO-MCNC: 142 MG/DL (ref 65–117)
GLUCOSE BLD STRIP.AUTO-MCNC: 242 MG/DL (ref 65–117)
GLUCOSE BLD STRIP.AUTO-MCNC: 309 MG/DL (ref 65–117)
GLUCOSE SERPL-MCNC: 103 MG/DL (ref 65–100)
HBA1C MFR BLD: 6.8 % (ref 4–5.6)
HCT VFR BLD AUTO: 46.3 % (ref 36.6–50.3)
HGB BLD-MCNC: 15.5 G/DL (ref 12.1–17)
IMM GRANULOCYTES # BLD AUTO: 0.1 K/UL (ref 0–0.04)
IMM GRANULOCYTES NFR BLD AUTO: 1 % (ref 0–0.5)
LYMPHOCYTES # BLD: 3.8 K/UL (ref 0.8–3.5)
LYMPHOCYTES NFR BLD: 32 % (ref 12–49)
MAGNESIUM SERPL-MCNC: 2.3 MG/DL (ref 1.6–2.4)
MCH RBC QN AUTO: 28.7 PG (ref 26–34)
MCHC RBC AUTO-ENTMCNC: 33.5 G/DL (ref 30–36.5)
MCV RBC AUTO: 85.7 FL (ref 80–99)
MONOCYTES # BLD: 0.7 K/UL (ref 0–1)
MONOCYTES NFR BLD: 5 % (ref 5–13)
NEUTS SEG # BLD: 6.9 K/UL (ref 1.8–8)
NEUTS SEG NFR BLD: 57 % (ref 32–75)
NRBC # BLD: 0 K/UL (ref 0–0.01)
NRBC BLD-RTO: 0 PER 100 WBC
PLATELET # BLD AUTO: 277 K/UL (ref 150–400)
PMV BLD AUTO: 11.1 FL (ref 8.9–12.9)
POTASSIUM SERPL-SCNC: 4.1 MMOL/L (ref 3.5–5.1)
RBC # BLD AUTO: 5.4 M/UL (ref 4.1–5.7)
SERVICE CMNT-IMP: ABNORMAL
SODIUM SERPL-SCNC: 139 MMOL/L (ref 136–145)
WBC # BLD AUTO: 12 K/UL (ref 4.1–11.1)

## 2022-12-31 PROCEDURE — 85025 COMPLETE CBC W/AUTO DIFF WBC: CPT

## 2022-12-31 PROCEDURE — 74011636637 HC RX REV CODE- 636/637: Performed by: INTERNAL MEDICINE

## 2022-12-31 PROCEDURE — 83036 HEMOGLOBIN GLYCOSYLATED A1C: CPT

## 2022-12-31 PROCEDURE — 96372 THER/PROPH/DIAG INJ SC/IM: CPT

## 2022-12-31 PROCEDURE — 82962 GLUCOSE BLOOD TEST: CPT

## 2022-12-31 PROCEDURE — 74011250637 HC RX REV CODE- 250/637: Performed by: INTERNAL MEDICINE

## 2022-12-31 PROCEDURE — 74011250636 HC RX REV CODE- 250/636: Performed by: INTERNAL MEDICINE

## 2022-12-31 PROCEDURE — 80048 BASIC METABOLIC PNL TOTAL CA: CPT

## 2022-12-31 PROCEDURE — 83735 ASSAY OF MAGNESIUM: CPT

## 2022-12-31 PROCEDURE — 74011000250 HC RX REV CODE- 250: Performed by: INTERNAL MEDICINE

## 2022-12-31 PROCEDURE — 36415 COLL VENOUS BLD VENIPUNCTURE: CPT

## 2022-12-31 PROCEDURE — G0378 HOSPITAL OBSERVATION PER HR: HCPCS

## 2022-12-31 PROCEDURE — 96376 TX/PRO/DX INJ SAME DRUG ADON: CPT

## 2022-12-31 RX ORDER — CLONAZEPAM 0.5 MG/1
1 TABLET ORAL
Status: DISCONTINUED | OUTPATIENT
Start: 2022-12-31 | End: 2023-01-02 | Stop reason: HOSPADM

## 2022-12-31 RX ADMIN — SODIUM CHLORIDE, PRESERVATIVE FREE 10 ML: 5 INJECTION INTRAVENOUS at 22:06

## 2022-12-31 RX ADMIN — ONDANSETRON 4 MG: 4 TABLET, ORALLY DISINTEGRATING ORAL at 08:28

## 2022-12-31 RX ADMIN — ENOXAPARIN SODIUM 40 MG: 100 INJECTION SUBCUTANEOUS at 08:32

## 2022-12-31 RX ADMIN — PANTOPRAZOLE SODIUM 40 MG: 40 TABLET, DELAYED RELEASE ORAL at 08:30

## 2022-12-31 RX ADMIN — Medication 3 UNITS: at 17:08

## 2022-12-31 RX ADMIN — ZIPRASIDONE HYDROCHLORIDE 60 MG: 40 CAPSULE ORAL at 08:32

## 2022-12-31 RX ADMIN — ONDANSETRON 4 MG: 2 INJECTION INTRAMUSCULAR; INTRAVENOUS at 03:01

## 2022-12-31 RX ADMIN — Medication 4 UNITS: at 22:05

## 2022-12-31 RX ADMIN — ZIPRASIDONE HYDROCHLORIDE 60 MG: 40 CAPSULE ORAL at 17:10

## 2022-12-31 RX ADMIN — METHADONE HYDROCHLORIDE 90 MG: 10 TABLET ORAL at 08:31

## 2022-12-31 RX ADMIN — CLONAZEPAM 1 MG: 0.5 TABLET ORAL at 10:33

## 2022-12-31 RX ADMIN — SODIUM CHLORIDE, PRESERVATIVE FREE 10 ML: 5 INJECTION INTRAVENOUS at 16:14

## 2022-12-31 RX ADMIN — CLONAZEPAM 1 MG: 0.5 TABLET ORAL at 17:09

## 2022-12-31 RX ADMIN — MIRTAZAPINE 30 MG: 15 TABLET, FILM COATED ORAL at 22:05

## 2022-12-31 NOTE — PROGRESS NOTES
Hospitalist Progress Note    NAME: Epifanio Stern   :  1980   MRN:  [id number]       Assessment / Plan:  Intractable nausea and vomiting POA- resolved  likely secondary to opiate withdrawal syndrome ( missed his Methadone on Friday as pt was unable to get to the Clinic due to nausea/vomiting per pt)  Restart patient's home methadone dosage- pharmacy to confirm pt is on 95 mg dose as claimed by pt before increasing from 90 mg as ordered  Zofran as needed for symptomatic relief- DC reglan  Continue to monitor patient's clinical status  IP psychiatry consulted in ER- will await input till pt here    History of bipolar disorder-  continue home medications    Type 2 diabetes-  insulin sliding scale at this time        25.0 - 29.9 Overweight / Body mass index is 26.85 kg/m². Estimated discharge date:   Barriers: Methadone clinic opens on Monday 5 AM    Code status: Full  Prophylaxis: Lovenox  Recommended Disposition: Home w/Family     Subjective:     Chief Complaint / Reason for Physician Visit: F/U Methadone withdrawal syndrome, Nausea/vomiting/Viral syndrome ?, poly substance abuse  \"I am better today\". Discussed with RN events overnight. Review of Systems:  Symptom Y/N Comments  Symptom Y/N Comments   Fever/Chills n   Chest Pain n    Poor Appetite n   Edema n    Cough n   Abdominal Pain n    Sputum n   Joint Pain     SOB/CHARLES    Pruritis/Rash     Nausea/vomit y improved  Tolerating PT/OT y    Diarrhea    Tolerating Diet y    Constipation    Other       Could NOT obtain due to:      Objective:     VITALS:   Last 24hrs VS reviewed since prior progress note.  Most recent are:  Patient Vitals for the past 24 hrs:   Temp Pulse Resp BP SpO2   22 0756 97.7 °F (36.5 °C) 72 18 133/73 96 %   22 0245 97.9 °F (36.6 °C) 62 19 (!) 142/88 96 %   22 2020 98.1 °F (36.7 °C) 88 18 119/71 96 %   22 1616 98 °F (36.7 °C) 70 15 (!) 135/96 96 %     No intake or output data in the 24 hours ending 12/31/22 1519     I had a face to face encounter and independently examined this patient on 12/31/2022, as outlined below:  PHYSICAL EXAM:  General: WD, WN. Alert, cooperative, no acute distress    EENT:  EOMI. Anicteric sclerae. MMM  Resp:  CTA bilaterally, no wheezing or rales. No accessory muscle use  CV:  Regular  rhythm,  No edema  GI:  Soft, Non distended, Non tender. +Bowel sounds  Neurologic:  Alert and oriented X 3, normal speech,   Psych:   Good insight. Not anxious nor agitated  Skin:  No rashes. No jaundice    Reviewed most current lab test results and cultures  YES  Reviewed most current radiology test results   YES  Review and summation of old records today    NO  Reviewed patient's current orders and MAR    YES  PMH/ reviewed - no change compared to H&P  ________________________________________________________________________  Care Plan discussed with:    Comments   Patient x    Family      RN x    Care Manager x Weekend Team   Consultant                        Multidiciplinary team rounds were held today with , nursing, pharmacist and clinical coordinator. Patient's plan of care was discussed; medications were reviewed and discharge planning was addressed. ________________________________________________________________________  Total NON critical care TIME:  36   Minutes    Total CRITICAL CARE TIME Spent:   Minutes non procedure based      Comments   >50% of visit spent in counseling and coordination of care     ________________________________________________________________________  Keith Lesch, MD     Procedures: see electronic medical records for all procedures/Xrays and details which were not copied into this note but were reviewed prior to creation of Plan. LABS:  I reviewed today's most current labs and imaging studies.   Pertinent labs include:  Recent Labs     12/31/22  0256 12/30/22  1026   WBC 12.0* 14.3*   HGB 15.5 16.0   HCT 46.3 47.6    327 Recent Labs     12/31/22  0256 12/30/22  1026    134*   K 4.1 4.1   * 105   CO2 28 28   * 126*   BUN 12 13   CREA 0.81 0.78   CA 9.0 9.6   MG 2.3  --    ALB  --  3.6   TBILI  --  0.4   ALT  --  56       Signed: Jocelyn Underwood MD

## 2022-12-31 NOTE — PROGRESS NOTES
3396 pt states that he 95 mg of methadone at a time. MD Jewell Damian stated to call clinic or pharmacy and  verify doage then update order accordingly. Phone number to clinic provide by [place]:[phone]  RN called nurse x2 and left VM   No return call. MD Jewell Damian notified Orders to keep dose at 90mg every day  Pt requested double portions. MD Jewell Damian states its okay to provide double portions.

## 2023-01-01 LAB
GLUCOSE BLD STRIP.AUTO-MCNC: 239 MG/DL (ref 65–117)
GLUCOSE BLD STRIP.AUTO-MCNC: 244 MG/DL (ref 65–117)
GLUCOSE BLD STRIP.AUTO-MCNC: 248 MG/DL (ref 65–117)
GLUCOSE BLD STRIP.AUTO-MCNC: 288 MG/DL (ref 65–117)
SERVICE CMNT-IMP: ABNORMAL

## 2023-01-01 PROCEDURE — 74011250637 HC RX REV CODE- 250/637: Performed by: INTERNAL MEDICINE

## 2023-01-01 PROCEDURE — 74011636637 HC RX REV CODE- 636/637: Performed by: INTERNAL MEDICINE

## 2023-01-01 PROCEDURE — 96372 THER/PROPH/DIAG INJ SC/IM: CPT

## 2023-01-01 PROCEDURE — G0378 HOSPITAL OBSERVATION PER HR: HCPCS

## 2023-01-01 PROCEDURE — 82962 GLUCOSE BLOOD TEST: CPT

## 2023-01-01 PROCEDURE — 74011250636 HC RX REV CODE- 250/636: Performed by: INTERNAL MEDICINE

## 2023-01-01 PROCEDURE — 74011000250 HC RX REV CODE- 250: Performed by: INTERNAL MEDICINE

## 2023-01-01 RX ORDER — DOCUSATE SODIUM 100 MG/1
100 CAPSULE, LIQUID FILLED ORAL 3 TIMES DAILY
Status: DISCONTINUED | OUTPATIENT
Start: 2023-01-01 | End: 2023-01-02 | Stop reason: HOSPADM

## 2023-01-01 RX ORDER — INSULIN GLARGINE 100 [IU]/ML
50 INJECTION, SOLUTION SUBCUTANEOUS 2 TIMES DAILY
Status: DISCONTINUED | OUTPATIENT
Start: 2023-01-01 | End: 2023-01-02

## 2023-01-01 RX ORDER — INSULIN LISPRO 100 [IU]/ML
10 INJECTION, SOLUTION INTRAVENOUS; SUBCUTANEOUS
Status: DISCONTINUED | OUTPATIENT
Start: 2023-01-01 | End: 2023-01-02

## 2023-01-01 RX ADMIN — CLONAZEPAM 1 MG: 0.5 TABLET ORAL at 08:40

## 2023-01-01 RX ADMIN — Medication 3 UNITS: at 12:31

## 2023-01-01 RX ADMIN — ONDANSETRON 4 MG: 4 TABLET, ORALLY DISINTEGRATING ORAL at 08:54

## 2023-01-01 RX ADMIN — PANTOPRAZOLE SODIUM 40 MG: 40 TABLET, DELAYED RELEASE ORAL at 08:44

## 2023-01-01 RX ADMIN — METHADONE HYDROCHLORIDE 90 MG: 10 TABLET ORAL at 08:44

## 2023-01-01 RX ADMIN — SODIUM CHLORIDE, PRESERVATIVE FREE 10 ML: 5 INJECTION INTRAVENOUS at 05:22

## 2023-01-01 RX ADMIN — Medication 5 UNITS: at 17:04

## 2023-01-01 RX ADMIN — INSULIN GLARGINE 50 UNITS: 100 INJECTION, SOLUTION SUBCUTANEOUS at 10:30

## 2023-01-01 RX ADMIN — SODIUM CHLORIDE, PRESERVATIVE FREE 10 ML: 5 INJECTION INTRAVENOUS at 22:15

## 2023-01-01 RX ADMIN — MIRTAZAPINE 30 MG: 15 TABLET, FILM COATED ORAL at 22:15

## 2023-01-01 RX ADMIN — CLONAZEPAM 1 MG: 0.5 TABLET ORAL at 17:01

## 2023-01-01 RX ADMIN — Medication 10 UNITS: at 12:29

## 2023-01-01 RX ADMIN — SODIUM CHLORIDE, PRESERVATIVE FREE 10 ML: 5 INJECTION INTRAVENOUS at 17:05

## 2023-01-01 RX ADMIN — INSULIN GLARGINE 50 UNITS: 100 INJECTION, SOLUTION SUBCUTANEOUS at 17:05

## 2023-01-01 RX ADMIN — ZIPRASIDONE HYDROCHLORIDE 60 MG: 40 CAPSULE ORAL at 17:06

## 2023-01-01 RX ADMIN — CLONAZEPAM 1 MG: 0.5 TABLET ORAL at 01:49

## 2023-01-01 RX ADMIN — DOCUSATE SODIUM 100 MG: 100 CAPSULE, LIQUID FILLED ORAL at 22:15

## 2023-01-01 RX ADMIN — Medication 2 UNITS: at 22:14

## 2023-01-01 RX ADMIN — ZIPRASIDONE HYDROCHLORIDE 60 MG: 40 CAPSULE ORAL at 08:44

## 2023-01-01 RX ADMIN — Medication 3 UNITS: at 08:44

## 2023-01-01 RX ADMIN — DOCUSATE SODIUM 100 MG: 100 CAPSULE, LIQUID FILLED ORAL at 08:44

## 2023-01-01 RX ADMIN — DOCUSATE SODIUM 100 MG: 100 CAPSULE, LIQUID FILLED ORAL at 17:05

## 2023-01-01 RX ADMIN — Medication 10 UNITS: at 17:02

## 2023-01-01 RX ADMIN — ENOXAPARIN SODIUM 40 MG: 100 INJECTION SUBCUTANEOUS at 08:45

## 2023-01-01 NOTE — PROGRESS NOTES
Problem: Falls - Risk of  Goal: *Absence of Falls  Description: Document Roman Kate Fall Risk and appropriate interventions in the flowsheet. 1/1/2023 0206 by Benuel Homans, RN  Outcome: Progressing Towards Goal  Note: Fall Risk Interventions:                             1/1/2023 0205 by Benuel Homans, RN  Outcome: Progressing Towards Goal  Note: Fall Risk Interventions:                                Problem: Patient Education: Go to Patient Education Activity  Goal: Patient/Family Education  1/1/2023 0206 by Benuel Homans, RN  Outcome: Progressing Towards Goal  1/1/2023 0205 by Benuel Homans, RN  Outcome: Progressing Towards Goal     Problem: Diabetes Self-Management  Goal: *Disease process and treatment process  Description: Define diabetes and identify own type of diabetes; list 3 options for treating diabetes. Outcome: Progressing Towards Goal  Goal: *Incorporating nutritional management into lifestyle  Description: Describe effect of type, amount and timing of food on blood glucose; list 3 methods for planning meals. 1/1/2023 0206 by Benuel Homans, RN  Outcome: Progressing Towards Goal  1/1/2023 0205 by Benuel Homans, RN  Outcome: Progressing Towards Goal  Goal: *Developing strategies to promote health/change behavior  Description: Define the ABC's of diabetes; identify appropriate screenings, schedule and personal plan for screenings. Outcome: Progressing Towards Goal  Goal: *Using medications safely  Description: State effect of diabetes medications on diabetes; name diabetes medication taking, action and side effects. Outcome: Progressing Towards Goal  Goal: *Monitoring blood glucose, interpreting and using results  Description: Identify recommended blood glucose targets  and personal targets.   1/1/2023 0206 by Benuel Homans, RN  Outcome: Progressing Towards Goal  1/1/2023 0205 by Benuel Homans, RN  Outcome: Progressing Towards Goal  Goal: *Prevention, detection, treatment of acute complications  Description: List symptoms of hyper- and hypoglycemia; describe how to treat low blood sugar and actions for lowering  high blood glucose level.   Outcome: Progressing Towards Goal     Problem: Patient Education: Go to Patient Education Activity  Goal: Patient/Family Education  Outcome: Progressing Towards Goal     Problem: Anxiety  Goal: *Alleviation of anxiety  Outcome: Progressing Towards Goal

## 2023-01-01 NOTE — PROGRESS NOTES
Hospitalist Progress Note    NAME: Neris Serrano   :  1980   MRN:  [id number]       Assessment / Plan:  Intractable nausea and vomiting POA- resolved  likely secondary to opiate withdrawal syndrome ( missed his Methadone on Friday as pt was unable to get to the Clinic due to nausea/vomiting per pt)  Restart patient's home methadone dosage- pharmacy to confirm pt is on 95 mg dose as claimed by pt before increasing from 90 mg as ordered  Zofran as needed for symptomatic relief- DC reglan  Continue to monitor patient's clinical status  IP psychiatry consulted in ER- will await input till pt here    History of bipolar disorder-  continue home medications    Type 2 diabetes-  Resume  home Lantus- decreased AM dose for now at 50 units (takes 100 per pt), cont PM dose close to as at home at 50 units (takes 55)  Cont insulin sliding scale         25.0 - 29.9 Overweight / Body mass index is 26.85 kg/m². Estimated discharge date:   Barriers: Methadone clinic opens on Monday 5 AM    Code status: Full  Prophylaxis: Lovenox  Recommended Disposition: Home w/Family     Subjective:     Chief Complaint / Reason for Physician Visit: F/U Methadone withdrawal syndrome, Nausea/vomiting/Viral syndrome ?, poly substance abuse  \"I am better today\". Discussed with RN events overnight. Review of Systems:  Symptom Y/N Comments  Symptom Y/N Comments   Fever/Chills n   Chest Pain n    Poor Appetite n   Edema n    Cough n   Abdominal Pain n    Sputum n   Joint Pain     SOB/CHARLES    Pruritis/Rash     Nausea/vomit y improved  Tolerating PT/OT y    Diarrhea    Tolerating Diet y    Constipation    Other       Could NOT obtain due to:      Objective:     VITALS:   Last 24hrs VS reviewed since prior progress note.  Most recent are:  Patient Vitals for the past 24 hrs:   Temp Pulse Resp BP SpO2   23 0746 98.1 °F (36.7 °C) 72 18 (!) 152/97 97 %   23 0522 97.3 °F (36.3 °C) 68 18 (!) 135/94 --   22 97.9 °F (36.6 °C) 67 16 137/78 97 %   12/31/22 1521 97.6 °F (36.4 °C) 72 18 138/82 95 %       No intake or output data in the 24 hours ending 01/01/23 0943     I had a face to face encounter and independently examined this patient on 1/1/2023, as outlined below:  PHYSICAL EXAM:  General: WD, WN. Alert, cooperative, no acute distress    EENT:  EOMI. Anicteric sclerae. MMM  Resp:  CTA bilaterally, no wheezing or rales. No accessory muscle use  CV:  Regular  rhythm,  No edema  GI:  Soft, Non distended, Non tender. +Bowel sounds  Neurologic:  Alert and oriented X 3, normal speech,   Psych:   Good insight. Not anxious nor agitated  Skin:  No rashes. No jaundice    Reviewed most current lab test results and cultures  YES  Reviewed most current radiology test results   YES  Review and summation of old records today    NO  Reviewed patient's current orders and MAR    YES  PMH/SH reviewed - no change compared to H&P  ________________________________________________________________________  Care Plan discussed with:    Comments   Patient x    Family      RN x    Care Manager x Weekend Team   Consultant                        Multidiciplinary team rounds were held today with , nursing, pharmacist and clinical coordinator. Patient's plan of care was discussed; medications were reviewed and discharge planning was addressed. ________________________________________________________________________  Total NON critical care TIME:  26   Minutes    Total CRITICAL CARE TIME Spent:   Minutes non procedure based      Comments   >50% of visit spent in counseling and coordination of care     ________________________________________________________________________  Kj Ibanez MD     Procedures: see electronic medical records for all procedures/Xrays and details which were not copied into this note but were reviewed prior to creation of Plan. LABS:  I reviewed today's most current labs and imaging studies.   Pertinent labs include:  Recent Labs     12/31/22  0256 12/30/22  1026   WBC 12.0* 14.3*   HGB 15.5 16.0   HCT 46.3 47.6    327       Recent Labs     12/31/22  0256 12/30/22  1026    134*   K 4.1 4.1   * 105   CO2 28 28   * 126*   BUN 12 13   CREA 0.81 0.78   CA 9.0 9.6   MG 2.3  --    ALB  --  3.6   TBILI  --  0.4   ALT  --  56         Signed: Jocelyn Underwood MD

## 2023-01-01 NOTE — PROGRESS NOTES
Comprehensive Nutrition Assessment    Type and Reason for Visit: Initial, Positive nutrition screen    Nutrition Recommendations/Plan:   RD to adjust diet to 4 CHO choices with double meat and veggies only  Recommend consult Diabetes Inpatient Management team (BG up to 300's)  Please document % meals and supplements consumed in flowsheet I/O's under intake      Malnutrition Assessment:  Malnutrition Status:  Mild malnutrition (01/01/23 5777)    Context:  Acute illness     Findings of the 6 clinical characteristics of malnutrition:   Energy Intake:  Mild decrease in energy intake (specify)  Weight Loss:  Greater than 7.5% over 3 months     Body Fat Loss:  No significant body fat loss,     Muscle Mass Loss:  No significant muscle mass loss,    Fluid Accumulation:  No significant fluid accumulation,     Strength:        Nutrition Assessment:  Pt admitted with intractable n/v.  PMH: methadone use, DM, HTN, psych hx, drug abuse. MST triggered for wt loss and poor appetite. Chart reviewed, pt lying in bed awake and alert. He reports he was in long-term and the food quality was poor. He admits to a mild decrease in appetite after he was released but that he has had a significant wt loss of 32lb over the past 2 months. Pt does not feel he has noticed a significant loss of fat or muscle loss but did notice his stomach is smaller than before. BG are in the 200-300's, which could explain unintentional wt loss. He was unable to access his methadone PTA and developed opiate withdrawal syndrome with n/v and reports vomiting up 'yellow stuff' (bile). We discussed how uncontrolled BG can cause wt loss, I recommended cutting down his CHO to 4 choices per meal and allowing double portions of protein and non-starchy veggies (which pt was agreeable to trying). Also recommended diabetes consult, will need to wait until they are back on service this week.     Wt Readings from Last 10 Encounters:   12/30/22 89.8 kg (198 lb) Nutrition Related Findings:    Meds: colace, lantus, humalog, methadone, remeron, protonix. BM 12/31 Wound Type: None    Current Nutrition Intake & Therapies:        ADULT DIET Regular; 4 carb choices (60 gm/meal); double meat and veggie portions only    Anthropometric Measures:  Height: 6' (182.9 cm)  Ideal Body Weight (IBW): 178 lbs (81 kg)     Current Body Wt:  89.8 kg (197 lb 15.6 oz), 111.2 % IBW. Stated  Current BMI (kg/m2): 26.8  Usual Body Weight: 104.3 kg (230 lb) (per pt)  % Weight Change (Calculated): -13.9                    BMI Category: Overweight (BMI 25.0-29. 9)    Estimated Daily Nutrient Needs:  Energy Requirements Based On: Formula  Weight Used for Energy Requirements: Current  Energy (kcal/day): MSJ 2200 (1847 x 1.2)  Weight Used for Protein Requirements: Current  Protein (g/day): 72g (0.8gPro/kg)  Method Used for Fluid Requirements: 1 ml/kcal  Fluid (ml/day): 2200mL    Nutrition Diagnosis:   Unintended weight loss related to endocrine dysfunction as evidenced by lab values, weight loss 7.5% in 3 months    Nutrition Interventions:   Food and/or Nutrient Delivery: Modify current diet  Nutrition Education/Counseling: No recommendations at this time  Coordination of Nutrition Care: Continue to monitor while inpatient, Other (specify) (recommend diabetes consult)       Goals:     Goals: PO intake 75% or greater, by next RD assessment (with BG <200mg/dL)       Nutrition Monitoring and Evaluation:   Behavioral-Environmental Outcomes: None identified  Food/Nutrient Intake Outcomes: Food and nutrient intake  Physical Signs/Symptoms Outcomes: Biochemical data, Nutrition focused physical findings, Skin, Weight, Nausea/vomiting    Discharge Planning:    Continue current diet    Honor DANNY Garcia, CNSC  Contact: ext [phone]

## 2023-01-01 NOTE — PROGRESS NOTES
Bedside and Verbal shift change report given to [address] [place] [address] Avenue (oncoming nurse) by Mayra Severino RN (offgoing nurse). Report included the following information SBAR, Kardex, Intake/Output, MAR, and Cardiac Rhythm NSR .

## 2023-01-01 NOTE — PROGRESS NOTES
RN notified LUCY that Pt indicated that he wants to go to [address] [place] [address] at [place] for rehab. CM called the [address]: [phone]  and they closed at 3:30 PM.  CM was instructed to call the direct line at [phone].        Joce Leiva, [address]  Ext [phone]

## 2023-01-02 VITALS
BODY MASS INDEX: 26.82 KG/M2 | RESPIRATION RATE: 18 BRPM | WEIGHT: 198 LBS | DIASTOLIC BLOOD PRESSURE: 82 MMHG | HEIGHT: 72 IN | HEART RATE: 83 BPM | SYSTOLIC BLOOD PRESSURE: 146 MMHG | OXYGEN SATURATION: 94 % | TEMPERATURE: 98.2 F

## 2023-01-02 LAB
GLUCOSE BLD STRIP.AUTO-MCNC: 107 MG/DL (ref 65–117)
GLUCOSE BLD STRIP.AUTO-MCNC: 124 MG/DL (ref 65–117)
GLUCOSE BLD STRIP.AUTO-MCNC: 359 MG/DL (ref 65–117)
SERVICE CMNT-IMP: ABNORMAL
SERVICE CMNT-IMP: ABNORMAL
SERVICE CMNT-IMP: NORMAL

## 2023-01-02 PROCEDURE — 96376 TX/PRO/DX INJ SAME DRUG ADON: CPT

## 2023-01-02 PROCEDURE — 74011250636 HC RX REV CODE- 250/636: Performed by: INTERNAL MEDICINE

## 2023-01-02 PROCEDURE — 74011000250 HC RX REV CODE- 250: Performed by: INTERNAL MEDICINE

## 2023-01-02 PROCEDURE — 74011250637 HC RX REV CODE- 250/637: Performed by: INTERNAL MEDICINE

## 2023-01-02 PROCEDURE — G0378 HOSPITAL OBSERVATION PER HR: HCPCS

## 2023-01-02 PROCEDURE — 74011636637 HC RX REV CODE- 636/637: Performed by: INTERNAL MEDICINE

## 2023-01-02 PROCEDURE — 82962 GLUCOSE BLOOD TEST: CPT

## 2023-01-02 PROCEDURE — 96372 THER/PROPH/DIAG INJ SC/IM: CPT

## 2023-01-02 RX ORDER — CLONAZEPAM 1 MG/1
1 TABLET ORAL 3 TIMES DAILY
Qty: 10 TABLET | Refills: 0 | Status: SHIPPED | OUTPATIENT
Start: 2023-01-02

## 2023-01-02 RX ORDER — ZIPRASIDONE HYDROCHLORIDE 60 MG/1
60 CAPSULE ORAL 2 TIMES DAILY WITH MEALS
Qty: 15 CAPSULE | Refills: 0 | Status: SHIPPED | OUTPATIENT
Start: 2023-01-02

## 2023-01-02 RX ORDER — INSULIN GLARGINE 100 [IU]/ML
INJECTION, SOLUTION SUBCUTANEOUS
Qty: 1 ADJUSTABLE DOSE PRE-FILLED PEN SYRINGE | Refills: 0 | Status: SHIPPED
Start: 2023-01-02

## 2023-01-02 RX ORDER — INSULIN GLARGINE 100 [IU]/ML
55 INJECTION, SOLUTION SUBCUTANEOUS
Status: DISCONTINUED | OUTPATIENT
Start: 2023-01-02 | End: 2023-01-02 | Stop reason: HOSPADM

## 2023-01-02 RX ORDER — INSULIN GLARGINE 100 [IU]/ML
80 INJECTION, SOLUTION SUBCUTANEOUS DAILY
Status: DISCONTINUED | OUTPATIENT
Start: 2023-01-02 | End: 2023-01-02 | Stop reason: HOSPADM

## 2023-01-02 RX ORDER — INSULIN LISPRO 100 [IU]/ML
15 INJECTION, SOLUTION INTRAVENOUS; SUBCUTANEOUS
Status: DISCONTINUED | OUTPATIENT
Start: 2023-01-02 | End: 2023-01-02 | Stop reason: HOSPADM

## 2023-01-02 RX ORDER — METHADONE HYDROCHLORIDE 10 MG/1
95 TABLET ORAL DAILY
Qty: 2 TABLET | Refills: 0 | Status: SHIPPED | OUTPATIENT
Start: 2023-01-03 | End: 2023-01-05

## 2023-01-02 RX ADMIN — INSULIN GLARGINE 80 UNITS: 100 INJECTION, SOLUTION SUBCUTANEOUS at 09:49

## 2023-01-02 RX ADMIN — PANTOPRAZOLE SODIUM 40 MG: 40 TABLET, DELAYED RELEASE ORAL at 07:07

## 2023-01-02 RX ADMIN — ONDANSETRON 4 MG: 2 INJECTION INTRAMUSCULAR; INTRAVENOUS at 13:26

## 2023-01-02 RX ADMIN — Medication 15 UNITS: at 09:51

## 2023-01-02 RX ADMIN — CLONAZEPAM 1 MG: 0.5 TABLET ORAL at 16:27

## 2023-01-02 RX ADMIN — ENOXAPARIN SODIUM 40 MG: 100 INJECTION SUBCUTANEOUS at 09:43

## 2023-01-02 RX ADMIN — CLONAZEPAM 1 MG: 0.5 TABLET ORAL at 09:42

## 2023-01-02 RX ADMIN — SODIUM CHLORIDE, PRESERVATIVE FREE 5 ML: 5 INJECTION INTRAVENOUS at 16:29

## 2023-01-02 RX ADMIN — Medication 5 UNITS: at 09:52

## 2023-01-02 RX ADMIN — ZIPRASIDONE HYDROCHLORIDE 60 MG: 40 CAPSULE ORAL at 16:27

## 2023-01-02 RX ADMIN — DOCUSATE SODIUM 100 MG: 100 CAPSULE, LIQUID FILLED ORAL at 09:42

## 2023-01-02 RX ADMIN — CLONAZEPAM 1 MG: 0.5 TABLET ORAL at 01:17

## 2023-01-02 RX ADMIN — ZIPRASIDONE HYDROCHLORIDE 60 MG: 40 CAPSULE ORAL at 09:42

## 2023-01-02 RX ADMIN — METHADONE HYDROCHLORIDE 90 MG: 10 TABLET ORAL at 09:42

## 2023-01-02 RX ADMIN — Medication 15 UNITS: at 13:24

## 2023-01-02 RX ADMIN — DOCUSATE SODIUM 100 MG: 100 CAPSULE, LIQUID FILLED ORAL at 16:27

## 2023-01-02 RX ADMIN — SODIUM CHLORIDE, PRESERVATIVE FREE 10 ML: 5 INJECTION INTRAVENOUS at 07:07

## 2023-01-02 NOTE — BSMART NOTE
BSMART Liaison Team Note        Patient goal(s) for today: communicate needs, use coping skills  BSMART Liaison team focus/goals: assess MH needs, provide education and support    Progress note: Presenting problem/Summary:  Pt came to ED 12/30/22 c/o methadone withdrawal, nausea and vomiting. He was admitted medically for stabilization. Pt expressed interest in rehab and CM has reached out to [facility]. Pt was seen face to face on medical unit. He was alert, oriented and anxious initially. He was pacing his room with his hands in the air. He expressed frustration and defeat that he just learned [facility] was going to deny his admission due to hep C  & B status. He was lamenting that every time he tries to help himself it falls apart. Liaison provided active listening and supportive presence. Pt was quickly able to reframe that he still have a place to stay where he felt safe, [facility] to help him with his psychiatric medications and family that will help him get back home to his aunt. He expressed a desire to live with his aunt and be her caregiver, apply for SSI and connect to a methadone clinic where they will live. He denied SI/intent/plan, HI/intent/plan and AVH. Barriers to Discharge: none, Pt is psychiatrically stable for discharge . Guns in the home: no     Outpatient provider(s):  [facility] info/prescription coverage:  [facility] MEDICAID [employer] PLAN    Diagnosis: polysubstance use, anxiety    Plan:  Pt is psychiatrically stable for discharge.    Follow up Psych Consult placed? no.   Psychiatrist updated? no       Participating treatment team members: Soundra Osgood, Cleophus Sharper, MSW

## 2023-01-02 NOTE — DISCHARGE INSTRUCTIONS
HOSPITALIST DISCHARGE INSTRUCTIONS    NAME: Reymundo Kirkpatrick   :  1980   MRN:  [id number]     Date/Time:  2023 12:09 PM    ADMIT DATE: 2022     DISCHARGE DATE: 2023     DISCHARGE DIAGNOSIS:  Intractable nausea and vomiting POA- resolved  likely secondary to opiate withdrawal syndrome ( missed his Methadone on Friday as pt was unable to get to the Clinic due to nausea/vomiting per pt)  History of bipolar disorder  Type 2 diabetes  Full code    MEDICATIONS:  As per medication reconciliation  list  It is important that you take the medication exactly as they are prescribed. Keep your medication in the bottles provided by the pharmacist and keep a list of the medication names, dosages, and times to be taken in your wallet. Do not take other medications without consulting your doctor. Pain Management: per above medications    What to do at Home    Recommended diet:  Diabetic Diet    Recommended activity: Activity as tolerated    If you have questions regarding the hospital related prescriptions or hospital related issues please call at [phone]. If you experience any of the following symptoms then please call your primary care physician or return to the emergency room if you cannot get hold of your doctor:  Fever, chills, nausea, vomiting, diarrhea, change in mentation, falling, bleeding, shortness of breath,     Follow Up:  PCP  you are to call and set up an appointment to see them in 7-10 days. Information obtained by :  I understand that if any problems occur once I am at home I am to contact my physician. I understand and acknowledge receipt of the instructions indicated above.                                                                                                                                            Physician's or R.N.'s Signature                                                                  Date/Time Patient or Representative Signature                                                          Date/Time

## 2023-01-02 NOTE — PROGRESS NOTES
Problem: Falls - Risk of  Goal: *Absence of Falls  Description: Document Arpita Blood Fall Risk and appropriate interventions in the flowsheet. 1/2/2023 1732 by Evelyn Carpenter RN  Outcome: Progressing Towards Goal  Note: Fall Risk Interventions:                             1/2/2023 1724 by Evelyn Carpenter RN  Outcome: Progressing Towards Goal  Note: Fall Risk Interventions:                                Problem: Patient Education: Go to Patient Education Activity  Goal: Patient/Family Education  1/2/2023 1732 by Evelyn Carpenter RN  Outcome: Progressing Towards Goal  1/2/2023 1724 by Evelyn Carpenter RN  Outcome: Progressing Towards Goal     Problem: Diabetes Self-Management  Goal: *Disease process and treatment process  Description: Define diabetes and identify own type of diabetes; list 3 options for treating diabetes. 1/2/2023 1732 by Evelyn Carpenter RN  Outcome: Progressing Towards Goal  1/2/2023 1724 by Evelyn Carpenter RN  Outcome: Progressing Towards Goal  Goal: *Incorporating nutritional management into lifestyle  Description: Describe effect of type, amount and timing of food on blood glucose; list 3 methods for planning meals. 1/2/2023 1732 by Evelyn Carpenter RN  Outcome: Progressing Towards Goal  1/2/2023 1724 by Evelyn Carpenter RN  Outcome: Progressing Towards Goal  Goal: *Incorporating physical activity into lifestyle  Description: State effect of exercise on blood glucose levels. 1/2/2023 1732 by Evelyn Carpenter RN  Outcome: Progressing Towards Goal  1/2/2023 1724 by Evelyn Carpenter RN  Outcome: Progressing Towards Goal  Goal: *Developing strategies to promote health/change behavior  Description: Define the ABC's of diabetes; identify appropriate screenings, schedule and personal plan for screenings.   1/2/2023 1732 by Evelyn Carpenter RN  Outcome: Progressing Towards Goal  1/2/2023 1724 by Evelyn Carpenter RN  Outcome: Progressing Towards Goal  Goal: *Using medications safely  Description: State effect of diabetes medications on diabetes; name diabetes medication taking, action and side effects. 1/2/2023 1732 by Paco Betancourt RN  Outcome: Progressing Towards Goal  1/2/2023 1724 by Paco Betancourt RN  Outcome: Progressing Towards Goal  Goal: *Monitoring blood glucose, interpreting and using results  Description: Identify recommended blood glucose targets  and personal targets. 1/2/2023 1732 by Paco Betancourt RN  Outcome: Progressing Towards Goal  1/2/2023 1724 by Paco Betancourt RN  Outcome: Progressing Towards Goal  Goal: *Prevention, detection, treatment of acute complications  Description: List symptoms of hyper- and hypoglycemia; describe how to treat low blood sugar and actions for lowering  high blood glucose level. 1/2/2023 1732 by Paco Betancourt RN  Outcome: Progressing Towards Goal  1/2/2023 1724 by Paco Betancourt RN  Outcome: Progressing Towards Goal  Goal: *Prevention, detection and treatment of chronic complications  Description: Define the natural course of diabetes and describe the relationship of blood glucose levels to long term complications of diabetes.   1/2/2023 1732 by Paco Betancourt RN  Outcome: Progressing Towards Goal  1/2/2023 1724 by Paco Betancourt RN  Outcome: Progressing Towards Goal  Goal: *Developing strategies to address psychosocial issues  Description: Describe feelings about living with diabetes; identify support needed and support network  1/2/2023 1732 by Paco Betancourt RN  Outcome: Progressing Towards Goal  1/2/2023 1724 by Paco Betancourt RN  Outcome: Progressing Towards Goal  Goal: *Sick day guidelines  1/2/2023 1732 by Paco Betancourt RN  Outcome: Progressing Towards Goal  1/2/2023 1724 by Paco Betancourt RN  Outcome: Progressing Towards Goal

## 2023-01-02 NOTE — PROGRESS NOTES
Transition of Care Plan:    RUR: OBS. State OBS letter delivered today. MANI: STABLE for DC today! CM setting up transportation for Pt through [facility]: [phone]. Ambulatory ride coming at 5 PM.     Disposition: Plan to Detox Program at [facility] [place]. 3:44 PM   CM received a call from [facility] and they indicated that they had received information that he was Hep A and B positive. They would not be able to accept him. CM asked Pt if he was Hep A and B positive and he stated yes. He just wants to go back to the [facility] at [address] [facility], [address] [facility]. CM setting up transportation for Pt through [facility]: [phone]. Ambulatory ride coming at 5 PM.    11:57 AM  Met with Pt and he has been talking with Detox Program  at [facility] [facility]. CM called [phone] ext [phone] and talked to Intake and they stated that they do have beds and would like to review his clinicals. CM asked to fax the following info to   Fax: [phone] to Admissions. H&P  MD Notes. MAR    If SNF or IPR: Date FOC offered:  Date FOC received:  Date authorization started with reference number:  Date authorization received and expires:  Accepting facility:    Follow up appointments: after rehab Pt will need New PCP  DME needed: n/a  Transportation at Discharge: Medicaid  Davie or means to access home:      n/a    Medicare Letter: n/a   Is patient a  and connected with the [facility]?              no  If yes, was [employer] transfer form completed and [facility] notified? Caregiver Contact: Nain Gallagher: [phone]  Discharge Caregiver contacted prior to discharge? yes  Care Conference needed?:      no    Reason for Admission:  Pt was admitted 12/30/22 d/t Generalized Body Aches. Not feeling well for 2-3 days and Out of BP meds. EMS stated Pt used [facility] Last night. Coming from [place].  Intractable N/V                     RUR Score:       OBS Plan for utilizing home health:  n/a        PCP: First and Last name:  None     Name of Practice:    Are you a current patient: Yes/No:    Approximate date of last visit:    Can you participate in a virtual visit with your PCP:                     Current Advanced Directive/Advance Care Plan: Full Code      Healthcare Decision Maker: Radha Fisher: [phone]    Eval.   Pt is alert and oriented. Pt had been at [facility] for 48 days and DC on 12/12 to [address] [place] [address] and DC to [facility]  at [address] [place] [address]. He has a Cell phone but it is still at the hotel. Roommate is keeping his belongings there at the hotel for him. Pt wants to go to [facility] at discharge to get clean. CM faxing now. CM will continue to monitor discharge plan. Care Management Interventions  PCP Verified by CM: Yes  Palliative Care Criteria Met (RRAT>21 & CHF Dx)?: No  Mode of Transport at Discharge: Other (see comment)  Transition of Care Consult (CM Consult):  Other  MyChart Signup: Yes  Discharge Durable Medical Equipment: No  Physical Therapy Consult: No  Occupational Therapy Consult: No  Speech Therapy Consult: No  Support Systems: Other Family Member(s), Twelve Step Program  Confirm Follow Up Transport: Other (see comment)  Discharge Location  Patient Expects to be Discharged to[de-identified] Other: (Latia Srivastava at [employer])    [place]ly Brun[place], [facility]

## 2023-01-02 NOTE — PROGRESS NOTES
Problem: Falls - Risk of  Goal: *Absence of Falls  Description: Document Michelle Starr Fall Risk and appropriate interventions in the flowsheet. Outcome: Progressing Towards Goal  Note: Fall Risk Interventions:                                Problem: Patient Education: Go to Patient Education Activity  Goal: Patient/Family Education  Outcome: Progressing Towards Goal     Problem: Diabetes Self-Management  Goal: *Disease process and treatment process  Description: Define diabetes and identify own type of diabetes; list 3 options for treating diabetes. Outcome: Progressing Towards Goal  Goal: *Incorporating nutritional management into lifestyle  Description: Describe effect of type, amount and timing of food on blood glucose; list 3 methods for planning meals. Outcome: Progressing Towards Goal  Goal: *Developing strategies to promote health/change behavior  Description: Define the ABC's of diabetes; identify appropriate screenings, schedule and personal plan for screenings. Outcome: Progressing Towards Goal  Goal: *Monitoring blood glucose, interpreting and using results  Description: Identify recommended blood glucose targets  and personal targets. Outcome: Progressing Towards Goal  Goal: *Prevention, detection, treatment of acute complications  Description: List symptoms of hyper- and hypoglycemia; describe how to treat low blood sugar and actions for lowering  high blood glucose level.   Outcome: Progressing Towards Goal  Goal: *Developing strategies to address psychosocial issues  Description: Describe feelings about living with diabetes; identify support needed and support network  Outcome: Progressing Towards Goal     Problem: Patient Education: Go to Patient Education Activity  Goal: Patient/Family Education  Outcome: Progressing Towards Goal     Problem: Anxiety  Goal: *Alleviation of anxiety  Outcome: Progressing Towards Goal

## 2023-01-02 NOTE — PROGRESS NOTES
Spiritual Care Assessment/Progress Note  [facility]      NAME: Daniel Frankel      MRN: [id number]  AGE: 38 y.o.  SEX: male  Jehovah's witness Affiliation: No preference   Language: English     1/2/2023     Total Time (in minutes): 50     Spiritual Assessment begun in [facility] 2 MED TELE through conversation with:         [x]Patient        [] Family    [] Friend(s)        Reason for Consult: Advance medical directive consult     Spiritual beliefs: (Please include comment if needed)     [x] Identifies with a liza tradition:         [] Supported by a liza community:            [] Claims no spiritual orientation:           [] Seeking spiritual identity:                [] Adheres to an individual form of spirituality:           [] Not able to assess:                           Identified resources for coping:      [x] Prayer                               [] Music                  [] Guided Imagery     [x] Family/friends                 [] Pet visits     [] Devotional reading                         [] Unknown     [] Other:                                               Interventions offered during this visit: (See comments for more details)    Patient Interventions: Advance medical directive completed, Prayer (assurance of), Prayer (actual), Affirmation of emotions/emotional suffering, Normalization of emotional/spiritual concerns, Initial/Spiritual assessment, patient floor           Plan of Care:     [x] Support spiritual and/or cultural needs    [x] Support AMD and/or advance care planning process      [] Support grieving process   [] Coordinate Rites and/or Rituals    [] Coordination with community clergy   [] No spiritual needs identified at this time   [] Detailed Plan of Care below (See Comments)  [] Make referral to Music Therapy  [] Make referral to Pet Therapy     [] Make referral to Addiction services  [] Make referral to [facility]  [] Make referral to Spiritual Care Partner  [] No future visits requested        [x] Contact Spiritual Care for further referrals     Comments:  received an In Basket request from [facility] for an Advance Medical Directive (AMD) for Mr. Jannet Meigs.  reviewed the AMD forms with Mr. Jannet Meigs.  allowed time for questions and answers. He completed the Advance Medical Directive forms; He received the original, and copies of the forms.  placed a copy in his chart. Primary Agent Halley Henderson (aunt) [phone]  Kita Chambers (brother) [phone]  He shared his concerns for his health and his life challenges.  provided a presence, encouragement, prayer and empathetic listening.  services are available 24 hours a day as requested. Rev. SHANTELL Willoughby.  [address] [facility]   Paging Service [phone])

## 2023-01-02 NOTE — PROGRESS NOTES
Bedside and Verbal shift change report given to Ramiro6 W Sabrina Abraham (oncoming nurse) by Leroy Carreon RN (offgoing nurse). Report included the following information SBAR, Kardex, Intake/Output, MAR, Recent Results, and Cardiac Rhythm NSR .

## 2023-01-02 NOTE — PROGRESS NOTES
Pharmacy Methadone dose verification:  Raul Navarro at [facility] for rehab and the call center person informed me that he was discharged from their inpatient clinic on 12/12/22  She asked me to call the outpatient clinic [facility] at [phone] and there is no answer left a message  I have also tried [phone] no answer left message   All of  these centers are all closed for the holiday. Unable to verify Methadone dose.      Ctoy Campbell [facility]

## 2023-01-02 NOTE — ACP (ADVANCE CARE PLANNING)
Advance Care Planning   Advance Care Planning Inpatient Note  [address]  Spiritual Care Department    Today's Date: 1/2/2023  Unit: [facility] 2 MED TELE    Received request from Health Care provider. Upon review of chart and communication with care team, patient's decision making abilities are not in question. Patient was/were present in the room during visit. Goals of ACP Conversation:  Discuss Advance Care planning documents  Facilitate a discussion related to patient's goals of care as they align with the patient's values and beliefs    Health Care Decision Makers:      Primary Decision MakerHailey Flores - [phone]    Secondary Decision Maker: Bo Welsh - [phone]    Summary:  Completed [address] Decision Maker  Updated Healthcare Decision Maker  Documented Next of Kin, per patient report    Advance Care Planning Documents (Patient Wishes) on file:  Living Will/ Advance Directive     Assessment:      received an In Basket request from [employer] for an Advance Medical Directive (AMD) for Mr. Leonard Cam.  reviewed the AMD forms with Mr. Leonard Cam.  allowed time for questions and answers. He completed the Advance Medical Directive forms; He received the original, and copies of the forms.  placed a copy in his chart. Primary Agent Maine Nichole (aunt) [phone]  Sofiya Warner (brother) [phone]  He shared his concerns for his health and his life challenges.  provided a presence, encouragement, prayer and empathetic listening.  services are available 24 hours a day as requested.      Interventions:  Provided education on documents for clarity and greater understanding  Discussed and provided education on state decision maker hierarchy  Assisted in the completion of documents according to patient's wishes at this time  Encouraged ongoing ACP conversation with future decision makers and loved ones    Care Preferences Communicated:  No    Outcomes/Plan:  ACP Discussion Completed  New Advance Directive completed  Returned original document(s) to patient, as well as copies for distribution to appointed agents  Copy of Advance Directive given to staff to scan into medical record    [place], [place] on 1/2/2023 at 3:34 PM

## 2023-01-02 NOTE — DISCHARGE SUMMARY
Hospitalist Discharge Summary     Patient ID:  Inga Lewis  [id number]  38 y.o.  1980 12/30/2022    PCP on record: None    Admit date: 12/30/2022  Discharge date and time: 1/2/2023    DISCHARGE DIAGNOSIS:    Intractable nausea and vomiting POA- resolved  likely secondary to opiate withdrawal syndrome ( missed his Methadone on Friday as pt was unable to get to the Clinic due to nausea/vomiting per pt)  History of bipolar disorder  Type 2 diabetes  Full code    CONSULTATIONS:  IP CONSULT TO PSYCHIATRY    Excerpted HPI from H&P of Orion Hunter MD:  \" 45-year-old male with past medical history multiple comorbidities presents to [facility] with complaints of nausea and vomiting. Patient states over the past few days has been having multiple episodes of nausea and vomiting, patient was unable to get his methadone dose, this is similar to methadone withdrawal that has occurred in the past.  Patient denies any fevers chills lightheadedness dizziness dyspnea orthopnea paroxysmal nocturnal dyspnea chest pain palpitations headache focal weakness loss of sensation auditory or visual symptoms abdominal stool or urinary complaints or any other associated symptoms. Patient endorses no recent sick contacts or travel activity\"    ______________________________________________________________________  DISCHARGE SUMMARY/HOSPITAL COURSE:  for full details see H&P, daily progress notes, labs, consult notes.    Intractable nausea and vomiting POA- resolved  likely secondary to opiate withdrawal syndrome ( missed his Methadone on Friday as pt was unable to get to the Clinic due to nausea/vomiting per pt)  Restart patient's home methadone dosage- pharmacy to confirm pt is on 95 mg dose as claimed by pt before increasing from 90 mg as ordered  Zofran as needed for symptomatic relief- DC reglan  Continue to monitor patient's clinical status  IP psychiatry consulted in ER- will await input till pt here    History of bipolar disorder-  continue home medications    Type 2 diabetes-  Resume  home Lantus- increased AM dose for now at 80 units (takes 100 per pt), increased PM dose to 55 units (takes 55u per pt)  Cont insulin sliding scale            _______________________________________________________________________  Patient seen and examined by me on discharge day. Pertinent Findings:  Gen:    Not in distress  Chest: Clear lungs  CVS:   Regular rhythm. No edema  Abd:  Soft, not distended, not tender  Neuro:  Alert, oriented x 3  _______________________________________________________________________  DISCHARGE MEDICATIONS:   Current Discharge Medication List        START taking these medications    Details   methadone (DOLOPHINE) 10 mg tablet Take 9.5 Tablets by mouth daily for 2 days. Max Daily Amount: 95 mg. Qty: 2 Tablet, Refills: 0  Start date: 1/3/2023, End date: 1/5/2023    Associated Diagnoses: Methadone maintenance therapy patient ([address]           CONTINUE these medications which have CHANGED    Details   insulin regular (HumuLIN R Regular U-100 Insuln) 100 unit/mL injection 17 Units by SubCUTAneous route Before breakfast and dinner. Qty: 1 mL, Refills: 0  Start date: 1/2/2023      insulin glargine (Lantus Solostar U-100 Insulin) 100 unit/mL (3 mL) inpn 100 units in AM , 55 units in PM  Qty: 1 Adjustable Dose Pre-filled Pen Syringe, Refills: 0  Start date: 1/2/2023      ziprasidone hcl (Geodon) 60 mg capsule Take 1 Capsule by mouth two (2) times daily (with meals). Qty: 15 Capsule, Refills: 0  Start date: 1/2/2023      clonazePAM (KlonoPIN) 1 mg tablet Take 1 Tablet by mouth three (3) times daily. Max Daily Amount: 3 mg. Qty: 10 Tablet, Refills: 0  Start date: 1/2/2023    Associated Diagnoses: Intractable nausea and vomiting           CONTINUE these medications which have NOT CHANGED    Details   cloNIDine HCL (CATAPRES) 0.2 mg tablet Take 0.2 mg by mouth two (2) times a day. mirtazapine (Remeron) 30 mg tablet Take 30 mg by mouth nightly. Patient Follow Up Instructions:    Activity: Activity as tolerated  Diet: Diabetic Diet      Follow-up with PCP    Follow-up Information       Follow up With Specialties Details Why Contact Info    None    None ([phone]) Patient stated that they have no PCP            ________________________________________________________________    Risk of deterioration: Low    Condition at Discharge:  Stable  __________________________________________________________________    Disposition  Home with family, no needs - going voluntarily to Drug Rehab center    ____________________________________________________________________    Code Status: Full Code  ___________________________________________________________________      Total time in minutes spent coordinating this discharge (includes going over instructions, follow-up, prescriptions, and preparing report for sign off to her PCP) :  35 minutes    Signed:  Debra Camarillo MD